# Patient Record
Sex: FEMALE | Race: BLACK OR AFRICAN AMERICAN | Employment: FULL TIME | URBAN - METROPOLITAN AREA
[De-identification: names, ages, dates, MRNs, and addresses within clinical notes are randomized per-mention and may not be internally consistent; named-entity substitution may affect disease eponyms.]

---

## 2022-01-26 PROBLEM — E11.65 TYPE 2 DIABETES MELLITUS WITH HYPERGLYCEMIA, WITH LONG-TERM CURRENT USE OF INSULIN (HCC): Status: ACTIVE | Noted: 2022-01-26

## 2022-01-26 PROBLEM — I10 ESSENTIAL HYPERTENSION: Status: ACTIVE | Noted: 2022-01-26

## 2022-01-26 PROBLEM — E78.00 HYPERCHOLESTEROLEMIA: Status: ACTIVE | Noted: 2022-01-26

## 2022-01-26 PROBLEM — Z79.4 TYPE 2 DIABETES MELLITUS WITH HYPERGLYCEMIA, WITH LONG-TERM CURRENT USE OF INSULIN (HCC): Status: ACTIVE | Noted: 2022-01-26

## 2022-01-31 ENCOUNTER — TELEPHONE (OUTPATIENT)
Dept: DIABETES SERVICES | Age: 57
End: 2022-01-31

## 2022-02-03 ENCOUNTER — HOSPITAL ENCOUNTER (OUTPATIENT)
Dept: DIABETES SERVICES | Age: 57
Discharge: HOME | End: 2022-02-03
Payer: COMMERCIAL

## 2022-02-03 PROCEDURE — G0108 DIAB MANAGE TRN  PER INDIV: HCPCS

## 2022-02-03 NOTE — PROGRESS NOTES
This is a one on one appointment. Due to being during [facility]-63 public health emergency, social distancing and mandatory precautions are in place and utilized Came for diabetes educational assessment today. Provided basic information on carbohydrates, proteins and fats. Educational need/plan: Will attend 2 nutrition/2 diabetes sessions to address the following: diabetes disease process, nutritional management, physical activity, using medications, preventing complications, psychosocial adjustment, goal setting, problem solving, monitoring, behavior change strategies. Hopes to gain the following from this educational program:  Being active, healthy coping, healthy eating, monitoring blood sugars, problem solving, taking medications and reducing risk of complications. Issues Identified:   - She would like Dexcom G due to does not need to scan during class. She will talk to physician.   - Metformin upsetting stomach even with extended release. She is not sure she can take if bathroom not available if out on play ground and not in her room. - Wants to lose weight  - Wants better blood sugar control  - Is taking 1/2 days for education.   - Fany Menchaca is for low blood sugars. Medication Reconciliation completed at today's visit.

## 2022-03-03 ENCOUNTER — HOSPITAL ENCOUNTER (OUTPATIENT)
Dept: DIABETES SERVICES | Age: 57
Discharge: HOME | End: 2022-03-03
Payer: COMMERCIAL

## 2022-03-03 PROCEDURE — G0109 DIAB MANAGE TRN IND/GROUP: HCPCS

## 2022-03-03 NOTE — PROGRESS NOTES
This is a class  appointment with limited persons allowed in class due to [facility]. Social distancing and mandatory precautions are in place and utilized. Attended nutrition diabetes #1 group session today. Topics included: disease process and treatment; diet factors impacting blood sugars including food choices, meal timing and portions, carbohydrate choices (emphasizing high fiber carbohydrates); proteins (emphasizing heart healthy choices) and fat food choices (emphasizing unsaturated fats); free foods; combination food choices; nutrition tips for persons with diabetes; snack ideas; resources for diabetes management. Two methods of meal planning were reviewed: carbohydrate choices and carbohydrate counting. Basics of exercise discussed. Voiced /demonstrated understanding of material covered. Anticipated adherence is good. Problems/barriers may be: none anticipated  Weight loss tips discussed. Pt received her CGM and wants a referral for CGM start. Will ask referring provider. No medication changes since last visit. No new procedure or surgery since last visit.

## 2022-03-08 ENCOUNTER — TELEPHONE (OUTPATIENT)
Dept: DIABETES SERVICES | Age: 57
End: 2022-03-08

## 2022-03-10 ENCOUNTER — TELEPHONE (OUTPATIENT)
Dept: DIABETES SERVICES | Age: 57
End: 2022-03-10

## 2022-03-14 ENCOUNTER — TELEPHONE (OUTPATIENT)
Dept: DIABETES SERVICES | Age: 57
End: 2022-03-14

## 2022-03-14 NOTE — TELEPHONE ENCOUNTER
Call to patient about Dexcom G6 start. She will be seen tomorrow at 3 PM after her Nutrition class for instruction.

## 2022-03-15 ENCOUNTER — HOSPITAL ENCOUNTER (OUTPATIENT)
Dept: DIABETES SERVICES | Age: 57
Discharge: HOME | End: 2022-03-15
Payer: COMMERCIAL

## 2022-03-15 PROCEDURE — G0109 DIAB MANAGE TRN IND/GROUP: HCPCS

## 2022-03-15 PROCEDURE — 95249 CONT GLUC MNTR PT PROV EQP: CPT

## 2022-03-15 NOTE — PROGRESS NOTES
This is a class  appointment with limited persons allowed in class due to [facility]-54 public health emergency. Social distancing and mandatory precautions are in place and utilized. Attended nutrition diabetes #2 group session today. Topics included: plate method for portion control; fiber and sodium guidelines; sugar substitutes; alcohol; eating out; recipe modification; label reading. Participant's goal: To keep blood sugars in target ranges she will limit carbs to 45 grams at meals and use the plate method to meal plan and reevaluate in 3 months. Participant's diabetes support plan: Use the daily diabetes meal planning guide and the plate method handout. Barriers identified: lack of preplanning, time management and taking care of a loved one. Voiced/demonstrated understanding of material covered. Anticipated adherence is good. No medication changes, procedures or surgeries since last visit. Plan for follow up is: will attend diabetes class.

## 2022-03-15 NOTE — PROGRESS NOTES
This is a one on one appointment. Due to being during [facility]-54 public health emergency, social distancing and mandatory precautions are in place and utilized. Pt seen today for initial insertion of Dexcom G6 system. Pt instructed on overview of continuous glucose monitoring (CGM) device and use. Instructed on sensor, transmitter and . Phone will be used. Pt instructed on download of Dexcom missy to phone. Time and date and transmitter ID verified. Instructed on troubleshooting, alerts, alarms, calibration, communication between sensor and , insertion of sensor and transmitter, starting sensor session, start up calibration, ending sensor session, removing sensor pod and transmitter and site rotation. Reviewed CGM guidelines and restrictions on use including no MRI, CT scans, or diathermy electrical treatments, bug spray, sun tanning lotions medications such as-Tylenol greater than standard dose ( 1 gram or 1000 mg every 6 hours) can make Dexcom G6 sensor readings look higher than they really are, or Hydroxyurea used for some cancers or sickle cell anemia - Your sensor readings will be higher than your actual glucose. Instructed only need to calibrate Dexcom G6, if blood glucose is 20 points off when blood glucose is under 70 mg/dl, or if blood sugar is 20% off when over 70 mg/dl. All questions and concerns addressed. Provided Dexcom technical support phone number. Medication Reconciliation. No surgery's or procedures.

## 2022-03-18 PROBLEM — I10 ESSENTIAL HYPERTENSION: Status: ACTIVE | Noted: 2022-01-26

## 2022-03-19 PROBLEM — Z79.4 TYPE 2 DIABETES MELLITUS WITH HYPERGLYCEMIA, WITH LONG-TERM CURRENT USE OF INSULIN (HCC): Status: ACTIVE | Noted: 2022-01-26

## 2022-03-19 PROBLEM — E11.65 TYPE 2 DIABETES MELLITUS WITH HYPERGLYCEMIA, WITH LONG-TERM CURRENT USE OF INSULIN (HCC): Status: ACTIVE | Noted: 2022-01-26

## 2022-03-19 PROBLEM — E78.00 HYPERCHOLESTEROLEMIA: Status: ACTIVE | Noted: 2022-01-26

## 2022-04-04 ENCOUNTER — HOSPITAL ENCOUNTER (OUTPATIENT)
Dept: DIABETES SERVICES | Age: 57
Discharge: HOME | End: 2022-04-04
Payer: COMMERCIAL

## 2022-04-04 PROCEDURE — G0109 DIAB MANAGE TRN IND/GROUP: HCPCS

## 2022-04-04 NOTE — PROGRESS NOTES
This is a class  appointment with limited persons allowed in class due to [facility]-63 public health emergency. Social distancing and mandatory precautions are in place and utilized. Participant attended Diabetes #1 session today. Topics included: Characteristics/pathophysiology type 1/type 2 diabetes; Goal/acceptable blood glucose ranges/Hgb A1C/interpreting/using results;meters, continuous glucose monitors and insulin pumps. Using medications safely; Sick day management; Prevention/detection/treatment of acute complications. - Verbalized understanding of material covered.  -Anticipated adherence is good   -Problems/barriers may be  none anticipated   Personal-  She got her Dexcom G6. Her sensor and transmitter fell off. Instructed to call Dexcom and see if they will replace it. Remind not to wear Dexcom about high magnetics. Medication Reconciliation Completed. No surgery or procedure.

## 2022-04-06 ENCOUNTER — TELEPHONE (OUTPATIENT)
Dept: DIABETES SERVICES | Age: 57
End: 2022-04-06

## 2022-04-12 ENCOUNTER — TELEPHONE (OUTPATIENT)
Dept: DIABETES SERVICES | Age: 57
End: 2022-04-12

## 2022-04-22 ENCOUNTER — TELEPHONE (OUTPATIENT)
Dept: DIABETES SERVICES | Age: 57
End: 2022-04-22

## 2022-06-02 RX ORDER — BLOOD-GLUCOSE TRANSMITTER
EACH MISCELLANEOUS
COMMUNITY

## 2022-06-02 RX ORDER — INSULIN GLARGINE-YFGN 100 [IU]/ML
20 INJECTION, SOLUTION SUBCUTANEOUS DAILY
COMMUNITY
End: 2022-06-03

## 2022-06-02 RX ORDER — VALACYCLOVIR HYDROCHLORIDE 500 MG/1
500 TABLET, FILM COATED ORAL 2 TIMES DAILY
COMMUNITY

## 2022-06-02 RX ORDER — BLOOD-GLUCOSE SENSOR
EACH MISCELLANEOUS
COMMUNITY

## 2022-06-02 RX ORDER — BLOOD-GLUCOSE,RECEIVER,CONT
EACH MISCELLANEOUS
COMMUNITY

## 2022-06-02 RX ORDER — FLUTICASONE PROPIONATE 50 MCG
1 SPRAY, SUSPENSION (ML) NASAL DAILY
COMMUNITY

## 2022-06-02 RX ORDER — INSULIN LISPRO 200 [IU]/ML
INJECTION, SOLUTION SUBCUTANEOUS
COMMUNITY
End: 2022-06-03 | Stop reason: SDUPTHER

## 2022-06-03 ENCOUNTER — OFFICE VISIT (OUTPATIENT)
Dept: ENDOCRINOLOGY | Age: 57
End: 2022-06-03
Payer: COMMERCIAL

## 2022-06-03 VITALS
BODY MASS INDEX: 37.56 KG/M2 | SYSTOLIC BLOOD PRESSURE: 138 MMHG | HEIGHT: 62 IN | OXYGEN SATURATION: 100 % | DIASTOLIC BLOOD PRESSURE: 82 MMHG | WEIGHT: 204.1 LBS | TEMPERATURE: 98.4 F | HEART RATE: 77 BPM

## 2022-06-03 DIAGNOSIS — I10 ESSENTIAL HYPERTENSION: ICD-10-CM

## 2022-06-03 DIAGNOSIS — M25.562 ARTHRALGIA OF BOTH KNEES: ICD-10-CM

## 2022-06-03 DIAGNOSIS — E78.00 HYPERCHOLESTEROLEMIA: ICD-10-CM

## 2022-06-03 DIAGNOSIS — E11.65 TYPE 2 DIABETES MELLITUS WITH HYPERGLYCEMIA, WITH LONG-TERM CURRENT USE OF INSULIN (HCC): Primary | ICD-10-CM

## 2022-06-03 DIAGNOSIS — M25.561 ARTHRALGIA OF BOTH KNEES: ICD-10-CM

## 2022-06-03 DIAGNOSIS — Z79.4 TYPE 2 DIABETES MELLITUS WITH HYPERGLYCEMIA, WITH LONG-TERM CURRENT USE OF INSULIN (HCC): Primary | ICD-10-CM

## 2022-06-03 LAB — HBA1C MFR BLD: 8.7 %

## 2022-06-03 PROCEDURE — 99215 OFFICE O/P EST HI 40 MIN: CPT | Performed by: DIETITIAN, REGISTERED

## 2022-06-03 PROCEDURE — 83036 HEMOGLOBIN GLYCOSYLATED A1C: CPT | Performed by: DIETITIAN, REGISTERED

## 2022-06-03 RX ORDER — INSULIN LISPRO 200 [IU]/ML
3 INJECTION, SOLUTION SUBCUTANEOUS
Qty: 6 ML | Refills: 3 | Status: SHIPPED | OUTPATIENT
Start: 2022-06-03

## 2022-06-03 RX ORDER — ROSUVASTATIN CALCIUM 10 MG/1
10 TABLET, COATED ORAL DAILY
Qty: 30 TABLET | Refills: 3 | Status: SHIPPED | OUTPATIENT
Start: 2022-06-03

## 2022-06-03 RX ORDER — EMPAGLIFLOZIN 10 MG/1
10 TABLET, FILM COATED ORAL DAILY
Qty: 30 TABLET | Refills: 3 | Status: SHIPPED | OUTPATIENT
Start: 2022-06-03

## 2022-06-03 RX ORDER — BLOOD SUGAR DIAGNOSTIC
STRIP MISCELLANEOUS
Qty: 200 EACH | Refills: 11 | Status: SHIPPED | OUTPATIENT
Start: 2022-06-03

## 2022-06-03 RX ORDER — LOSARTAN POTASSIUM 25 MG/1
25 TABLET ORAL DAILY
Qty: 30 TABLET | Refills: 11 | Status: SHIPPED | OUTPATIENT
Start: 2022-06-03 | End: 2023-06-03

## 2022-06-03 RX ORDER — INSULIN DETEMIR 100 [IU]/ML
18 INJECTION, SOLUTION SUBCUTANEOUS EVERY MORNING
Qty: 6 ML | Refills: 5 | Status: SHIPPED | OUTPATIENT
Start: 2022-06-03 | End: 2022-06-06 | Stop reason: SDUPTHER

## 2022-06-03 RX ORDER — PEN NEEDLE, DIABETIC 31 GX5/16"
1 NEEDLE, DISPOSABLE MISCELLANEOUS DAILY
Qty: 100 EACH | Refills: 3 | Status: SHIPPED | OUTPATIENT
Start: 2022-06-03

## 2022-06-03 RX ORDER — GLUCAGON INJECTION, SOLUTION 1 MG/.2ML
1 INJECTION, SOLUTION SUBCUTANEOUS AS NEEDED
Qty: 0.4 ML | Refills: 11 | Status: SHIPPED | OUTPATIENT
Start: 2022-06-03

## 2022-06-03 ASSESSMENT — ENCOUNTER SYMPTOMS
COUGH: 0
WHEEZING: 0
VOICE CHANGE: 0
NAUSEA: 0
BACK PAIN: 0
VOMITING: 0
DIARRHEA: 1
ABDOMINAL PAIN: 1
TROUBLE SWALLOWING: 0
CONSTIPATION: 0
SHORTNESS OF BREATH: 0

## 2022-06-03 NOTE — PROGRESS NOTES
[facility]    WAQ[address] Tiwari  [address], [address] [facility]  [place], [address]  Phone [phone]  Facsimile [phone]      Reason for visit: Jaime SHARMA (1965) is here for follow up of Type 2 Diabetes Mellitus. ASSESSMENT AND PLAN:    1. Type 2 diabetes mellitus with hyperglycemia, with long-term current use of insulin ([facility])  A1c is 8.7%. Glycemic control is suboptimal.  Patient has been using Humalog alone with correction scale 2 for 50 above 150 to manage her blood sugars, but is taking it after she eats rather than with the first bite of food. Patient is unable to take metformin due to diarrhea. Patient has reservations about GLP-1 receptor agonist due to side effect profile and that her sister developed loss of memory with the use of Ozempic. Patient reports Semglee causes diarrhea. Patient reports her biggest challenge is medication compliance due to busy lifestyle. Recent colonoscopy report - no internal bleeding. Patient reports all GI side effects -diarrhea are from metformin and semglee. --- Start Levemir 18 units daily every morning. --- Start Jardiance 10 mg daily every morning. --- Humalog 3 units AC TID with Correction 1 unit for 50 above 150 mg/dL. ---  Screening labs. --- Instructed patient to take a daily probiotic - largest billion, widest number of bacteria to assist with preventing yeast infections. --- Instructed patient to take Humalog with the first bite of food 3 times daily - rather than at the end of the meal.    - AMB POC HEMOGLOBIN A1C  - ONETOUCH VERIO strip; Check BG 3-4 times daily. E11.65  Dispense: 200 each; Refill: 11  - LEVEMIR FLEXTOUCH 100 UNIT/ML injection pen; Inject 18 Units into the skin every morning  Dispense: 6 mL; Refill: 5  - HUMALOG KWIKPEN 200 UNIT/ML SOPN pen;  Inject 3 Units into the skin 3 times daily (before meals) Add Correction Scale 1 units for every 50 points greater than 150 mg/dL. Max daily Dose: 30 units E11.65. Dispense: 6 mL; Refill: 3  - GVOKE HYPOPEN 2-PACK 1 MG/0.2ML SOAJ; Inject 1 mg into the skin as needed (as needed for BG less than 50 mg/dL)  Dispense: 0.4 mL; Refill: 11 - B-D UF III MINI PEN NEEDLES 31G X 5 MM MISC; 1 each by Does not apply route daily  Dispense: 100 each; Refill: 3  - JARDIANCE 10 MG tablet; Take 1 tablet by mouth daily  Dispense: 30 tablet; Refill: 3  - Comprehensive Metabolic Panel; Future  - Vitamin D 25 Hydroxy; Future  - Lipid Panel; Future  - Glutamic Acid Decarboxylase; Future  - TSH with Reflex; Future  - C-Peptide; Future    2. Essential hypertension  BP controlled. Patient requested refill today. BP Readings from Last 3 Encounters:   06/03/22 138/82   01/26/22 120/78     - losartan (COZAAR) 25 MG tablet; Take 1 tablet by mouth daily  Dispense: 30 tablet; Refill: 11    3. Hypercholesterolemia  Restart Statin, recheck lipids 9/2022. Patient reports she had stopped taking her cholesterol medication that was prescribed by her PCP. She could not remember what dose she was taking. Will re-start rosuvastatin at lower dose. - rosuvastatin (CRESTOR) 10 MG tablet; Take 1 tablet by mouth daily  Dispense: 30 tablet; Refill: 3    4. Arthralgia of both knees   Patient will benefit from weight reduction to reduce knee pain. Patient will also benefit from Voltaren gel - OTC use as directed. Follow-up and Dispositions    · Return in about 1 month (around 7/3/2022). Tests or Results Reviewed: (see lab dates below in note) HgbA1C, Cr, GFR, Microalbumin/Cr ratio, Lipid Profile, TSH. History of Present Illness:      History given by patient. Date of DM diagnosis: age 46, year 2016      Current Diabetes Medications: Humalog 2-10 units. Not taking metformin due to diarrhea. Medication Failures: Trulicity - diarrhea, metformin - diarrhea, Semglee - diarrhea.       Current symptoms: See Review of Systems      Neuropathy: hand feet Nephropathy: Stage 2 Kidney Disease    12/29/2021     Cr 1.12, GFR 63, microalbumin/Cr ratio none      Retinopathy: Last eye exam was 04/2022 and demonstrated no diabetic retinopathy. Left eye needs cataract surgery. Eye care specialist is [facility], Dr. Anjelica Tyson. Diet: 3 meals daily   Drink: pepsi once daily, water, apple juice. Drinking more water. Exercise:  walking in neighborhood - 30 minutes      Diabetes education: The patient has received formal diabetes education. Hemoglobin A1c:       12/29/2021     9.9%  01/26/2022     11.1%  06/03/2022      8.7%      Home blood glucose monitoring frequency: 1 times per day      Blood glucose: by glucometer review              fasting [id number]              AC lunch none               AC supper 174-372              bedtime none      Hypoglycemia: at 150 - feels dizzy and off balance, light headed. Pregnancy: s/p - partial hysterectomy - has ovaries. Lipids:    2/26/2021 TC-229, LDL-149, VLDL- 24,  HDL- 52, TG- 122      Thyroid:   none      BP:         BP Readings from Last 3 Encounters:        01/26/22  120/78         Weight Trends: Wt Readings from Last 3 Encounters:        01/26/22  202 lb 4.8 oz (91.8 kg)         Medical/Surgical/Social/Family History: Reviewed in Chart      Medications: Reviewed in chart      Allergies   Azithromycin, Efinaconazole, Terbinafine hcl, and Penicillins    /82   Pulse 77   Temp 98.4 °F (36.9 °C) (Tympanic)   Ht 5' 2\" (1.575 m)   Wt 204 lb 1.6 oz (92.6 kg)   SpO2 100%   BMI 37.33 kg/m²     Weight Trends: Wt Readings from Last 3 Encounters:   06/03/22 204 lb 1.6 oz (92.6 kg)   01/26/22 202 lb 4.8 oz (91.8 kg)       Allergies and Medications: Reviewed in Chart    Review of Systems   Constitutional: Positive for appetite change (hungry - but not always having time to eat) and fatigue (taking care of father ). Negative for diaphoresis and unexpected weight change.    HENT: Negative for trouble swallowing and voice change. Eyes: Negative for visual disturbance. Respiratory: Negative for cough, shortness of breath and wheezing. Cardiovascular: Positive for leg swelling. Negative for chest pain and palpitations. Gastrointestinal: Positive for abdominal pain (when taking semglee or metformin) and diarrhea (with metformin and semglee). Negative for constipation, nausea and vomiting. Endocrine: Positive for polydipsia. Negative for cold intolerance, heat intolerance, polyphagia and polyuria. Genitourinary: Negative for difficulty urinating and frequency. Musculoskeletal: Positive for arthralgias (knee pain - \"like there is fluid on them\"). Negative for back pain and myalgias. Skin: Negative for pallor. Neurological: Positive for dizziness and light-headedness (in morning when waking and light headed). Negative for tremors, weakness, numbness and headaches. Hematological: Negative for adenopathy. Psychiatric/Behavioral: Negative for dysphoric mood and sleep disturbance. The patient is not nervous/anxious. Physical Exam  Constitutional:       General: She is not in acute distress. Appearance: Normal appearance. She is normal weight. She is not ill-appearing. HENT:      Head: Normocephalic. Cardiovascular:      Rate and Rhythm: Normal rate and regular rhythm. Pulses: Normal pulses. Pulmonary:      Effort: No respiratory distress. Breath sounds: Normal breath sounds. No wheezing or rhonchi. Chest:      Chest wall: No tenderness. Abdominal:      General: There is no distension. Palpations: Abdomen is soft. Tenderness: There is no abdominal tenderness. There is no guarding. Musculoskeletal:         General: No swelling, tenderness or signs of injury. Cervical back: Neck supple. No tenderness. Right lower leg: No edema. Left lower leg: No edema. Feet:      Right foot:      Skin integrity: Skin integrity normal. No ulcer. Toenail Condition: Fungal disease present. Left foot:      Skin integrity: Skin integrity normal. No ulcer. Comments: Right Foot - toenail missing. Lymphadenopathy:      Cervical: No cervical adenopathy. Skin:     General: Skin is warm and dry. Findings: No erythema or rash. Neurological:      Mental Status: She is alert. Motor: No weakness. Psychiatric:         Mood and Affect: Mood normal.         Behavior: Behavior normal.         Orders Placed This Encounter   Procedures    Comprehensive Metabolic Panel     Standing Status:   Future     Standing Expiration Date:   6/3/2023    Vitamin D 25 Hydroxy     Standing Status:   Future     Standing Expiration Date:   6/3/2023    Lipid Panel     Standing Status:   Future     Standing Expiration Date:   6/3/2023     Order Specific Question:   Is Patient Fasting?/# of Hours     Answer:   yes    Glutamic Acid Decarboxylase     Standing Status:   Future     Standing Expiration Date:   6/3/2023    TSH with Reflex     Standing Status:   Future     Standing Expiration Date:   6/3/2023    C-Peptide     Standing Status:   Future     Standing Expiration Date:   6/3/2023    AMB POC HEMOGLOBIN A1C       Current Outpatient Medications   Medication Sig Dispense Refill    rosuvastatin (CRESTOR) 10 MG tablet Take 1 tablet by mouth daily 30 tablet 3    ONETOUCH VERIO strip Check BG 3-4 times daily. E11.65 200 each 11    losartan (COZAAR) 25 MG tablet Take 1 tablet by mouth daily 30 tablet 11    LEVEMIR FLEXTOUCH 100 UNIT/ML injection pen Inject 18 Units into the skin every morning 6 mL 5    HUMALOG KWIKPEN 200 UNIT/ML SOPN pen Inject 3 Units into the skin 3 times daily (before meals) Add Correction Scale 1 units for every 50 points greater than 150 mg/dL.  Max daily Dose: 30 units E11.65. 6 mL 3    GVOKE HYPOPEN 2-PACK 1 MG/0.2ML SOAJ Inject 1 mg into the skin as needed (as needed for BG less than 50 mg/dL) 0.4 mL 11    B-D UF III MINI PEN NEEDLES 31G X 5 MM MISC 1 each by Does not apply route daily 100 each 3    JARDIANCE 10 MG tablet Take 1 tablet by mouth daily 30 tablet 3    valACYclovir (VALTREX) 500 MG tablet Take 500 mg by mouth 2 times daily      fluticasone (FLONASE) 50 MCG/ACT nasal spray 1 spray by Each Nostril route daily      albuterol sulfate  (90 Base) MCG/ACT inhaler INHALE ONE TO TWO PUFFS BY MOUTH EVERY 4 TO 6 HOURS AS NEEDED FOR SHORTNESS OF BREATH OR WHEEZING      Glucagon (BAQSIMI ONE PACK) 3 MG/DOSE POWD Spray one device (3 mg) in one nostril to treat severe hypoglycemia less than 50 mg/dL.  Continuous Blood Gluc Transmit (DEXCOM G6 TRANSMITTER) MISC by Does not apply route      Continuous Blood Gluc Sensor (DEXCOM G6 SENSOR) MISC by Does not apply route      Continuous Blood Gluc  (DEXCOM G6 ) JEFFERSON by Does not apply route      Lancets MISC 1 Package by Other route 4 times daily       No current facility-administered medications for this visit. LYNN Trent NP    On this date 6/3/2022 I have spent 45 minutes reviewing previous notes, test results and face to face with the patient discussing the diagnosis and importance of compliance with the treatment plan as well as documenting on the day of the visit. Portions of this note were generated with the assistance of voice recognition software. As such, some errors in transcription may be present.

## 2022-06-06 ENCOUNTER — TELEPHONE (OUTPATIENT)
Dept: ENDOCRINOLOGY | Age: 57
End: 2022-06-06

## 2022-06-06 DIAGNOSIS — E11.65 TYPE 2 DIABETES MELLITUS WITH HYPERGLYCEMIA, WITH LONG-TERM CURRENT USE OF INSULIN (HCC): ICD-10-CM

## 2022-06-06 DIAGNOSIS — Z79.4 TYPE 2 DIABETES MELLITUS WITH HYPERGLYCEMIA, WITH LONG-TERM CURRENT USE OF INSULIN (HCC): ICD-10-CM

## 2022-06-06 RX ORDER — INSULIN DETEMIR 100 [IU]/ML
18 INJECTION, SOLUTION SUBCUTANEOUS EVERY MORNING
Qty: 15 ML | Refills: 5 | Status: SHIPPED | OUTPATIENT
Start: 2022-06-06

## 2022-06-06 NOTE — TELEPHONE ENCOUNTER
Received a VM from [facility]. [facility] stated a new prescription for Levemir needs to be sent in for 15 mL. Humalog is correct. New prescription has been sent in as requested.

## 2022-07-28 ENCOUNTER — TELEPHONE (OUTPATIENT)
Dept: DIABETES SERVICES | Age: 57
End: 2022-07-28

## 2022-07-28 NOTE — TELEPHONE ENCOUNTER
Patient called and left  a message to reschedule her Diabetes 2 one on on appointment for tomorrow. Called and left a message to reschedule.

## 2022-08-02 ENCOUNTER — FOLLOWUP TELEPHONE ENCOUNTER (OUTPATIENT)
Dept: DIABETES SERVICES | Age: 57
End: 2022-08-02

## 2022-08-02 NOTE — TELEPHONE ENCOUNTER
Last call to pt asking for a return call to r/s her last diabetes #2 class. Left message if we don't hear from pt by 8/8/22 we will close chart and let her MD know she has not completed the program. Pt has been scheduled 4 times for this class including a 1:1 and pt has called to r/s.

## 2022-08-10 ENCOUNTER — FOLLOWUP TELEPHONE ENCOUNTER (OUTPATIENT)
Dept: DIABETES SERVICES | Age: 57
End: 2022-08-10

## 2022-08-10 NOTE — TELEPHONE ENCOUNTER
Pt has called 4 times her diabetes education appointments. Pt had diabetes assessmetn on 2/3/22. We will no longer pursue trying to get this client scheduled to complete diabetes self management education. Pt completed nutrition 1 and 2 class and diabetes medical #1 class. Will close chart.

## 2022-11-14 NOTE — TELEPHONE ENCOUNTER
Called and left message in an attempt to schedule a CGM start. Left call back number. 4 = No assist / stand by assistance